# Patient Record
(demographics unavailable — no encounter records)

---

## 2025-02-14 NOTE — DISCUSSION/SUMMARY
[Normal Growth] : growth [Normal Development] : development  [No Elimination Concerns] : elimination [Continue Regimen] : feeding [No Skin Concerns] : skin [Normal Sleep Pattern] : sleep [FreeTextEntry1] : - Parent/guardian declined immunization for  flu and was counseled regarding risk.

## 2025-02-14 NOTE — PHYSICAL EXAM
[TextEntry] : General:  alert, no acute distress Head:  normocephalic Eyes:  EOMI bilateral Ears:  clear tympanic membranes with bony landmarks and light reflex present bilaterally Nose:  no rhinorrhea Mouth:  nonerythematous oropharynx Neck:  supple, full passive range of motion, no palpable masses Lungs:  clear to auscultation bilaterally Cardiac:  regular rate and rhythm, normal S1, S2 audible, no murmurs Abdomen:  soft, non tender, non distended, normoactive bowel sounds, no hepatomegaly, no splenomegaly Genitalia: Aly 1, circumcised, bilateral descended testes, no testicular masses, no hernia Lymphatics:  no abnormal lymph nodes palpated. Musculoskeletal:  normal muscle tone, no gait asymmetry, no pain or deformities with palpation of bone, muscles, joints Spine:  straight, no scoliosis Neurologic:  +2 patella DTR, cranial nerves grossly intact Skin:  no rash or lesions

## 2025-02-14 NOTE — DEVELOPMENTAL MILESTONES
[Normal Development] : Normal Development [None] : none [FreeTextEntry1] : Gross Motor: 5y-10 Fine Motor Adaptive:5y-7  Psychosocial: 5y Language:5y-3

## 2025-02-14 NOTE — PLAN
[TextEntry] : Continue balanced diet with all food groups. Brush teeth twice a day with toothbrush. Recommend visit to dentist. Maintain consistent daily routines and sleep schedule.School discussed. Ensure home is safe. Teach child about personal safety. Limit screen time to no more than 2 hours per day. Encourage physical activity. Return 1 year for routine well child check. Reviewed 5-2-1-0 questionnaire Cardiac questionnaire reviewed Patient may participate in all activities without restriction.  Epistaxis: Humidifier vaseline in anterior nares if no improvement, RTO

## 2025-02-14 NOTE — HISTORY OF PRESENT ILLNESS
[Mother] : mother [Fruit] : fruit [Vegetables] : vegetables [Dairy] : dairy [Normal] : Normal [Brushing teeth] : Brushing teeth [Yes] : Patient goes to dentist yearly [Playtime (60 min/d)] : Playtime 60 min a day [In ] : In  [No difficulties with Homework] : No difficulties with homework  [No] : No cigarette smoke exposure [Influenza] : Influenza [NO] : No [Exposure to electronic nicotine delivery system] : No exposure to electronic nicotine delivery system [FreeTextEntry7] : 4yo [facility] [de-identified] : nosebleeds few times a week, picks nose, lasts briefly [FreeTextEntry1] : nosebleeds

## 2025-06-30 NOTE — HISTORY OF PRESENT ILLNESS
[Public] : Public [Gen Ed: _____] : General Education class [unfilled] [FreeTextEntry1] : Nick, an almost 6-year-old male, presents with his mother for evaluation of attention issues and to discuss 504 plan accommodations for school. The patient recently completed , where his teacher expressed concerns about his attention and focus. The family has been working with Bre, the behavioral health specialist, who determined that Nick met diagnostic criteria for adhd. The parents are seeking a 504 plan or IEP to ensure Nick's success in school. They have been trying various coping mechanisms and are hesitant to start medication at this young age. The teacher implemented strategies such as a partitioned desk and a rocking chair, which showed some success. Despite these interventions, Nick is reportedly missing about 80% of his school day due to attention issues, although he is meeting most academic marks. He has no issues sleeping and he eats well. He has no issues socially.  - Past Medical History : No significant past medical history - Family History : - Father diagnosed with ADHD - Father prescribed Ritalin in middle school - Father continues to work on focus and coping mechanisms as an adult - Social History : - Lives with parents - Attended  this past year - Participates in [employer] - Completed swimming lessons - Tried lacrosse earlier in the year - Family engages in activities such as going to the beach and park  - Diagnostic Results : Christiano scales completed, results consistent with ADHD diagnosis, though patient is technically below the authorized age range for the assessment tool he is less than 1 month from 6 years old and the tool can be used in  age as appropirate  [Major Illness] : no major illness [Major Injury] : no major injury [Surgery] : no surgery [Hospitalizations] : no hospitalizations [New Medications] : no new medication [New Allergies] : no new allergies

## 2025-06-30 NOTE — PLAN
[CPSE Evaluation] : - Referred to the Committee on  Special Education for complete evaluation including intelligence, adaptive, speech and language, and motor evaluations [Implement 504 Plan] : - A 504 plan should be implemented. The plan should provide, at a minimum, for extended time for testing and the opportunity to take or finish tests in a quiet, separate location. [Home Behavior Techniques] : - Specific behavioral techniques that can be implemented at home were discussed [Limit Screen Time] : - Limit screen time [Medication Deferred] : - After discussion with the family the decision was made to defer consideration of treatment with medication [ADHD - A Complete & Authoritative Guide] : - ADHD - A Complete and Authoritative Guide by Vinayak Leiva [Clinical Basis] : Clinical basis for current diagnosis and clinical impressions [Prior Rating Scales] : Clinical implications of prior rating scales [504] : Entitlements under Section 504 of the Americans with Disabilities Act ("accommodation plans") [Family Questions] : Family's questions were addressed [Sleep] : The importance of sleep and strategies to ensure adequate sleep [FreeTextEntry1] : - Attention Deficit Hyperactivity Disorder (ADHD) : Nick presents with symptoms consistent with ADHD, including attention difficulties in school and at home. Family history is significant for ADHD in his father. Recent evaluation by a specialist (Bre) supports this diagnosis. Given his young age and the family's preference, we will pursue non-pharmacological interventions at this time. - Provide letter supporting 504 plan accommodations for school - Encourage continuation of behavioral strategies at home and school - Recommend exploring martial arts or similar activities for mind-body connection and focus improvement - Discuss potential for heavy work activities to aid in self-regulation - Continue monitoring sleep patterns to ensure they remain adequate - Follow up with school regarding implementation of 504 plan - Consider referral for additional educational testing if needed - Schedule follow-up appointment to assess progress and discuss further interventions if necessary - Resources provided for parent support groups and ADHD management strategies - Con't good sleep and eating patterns

## 2025-06-30 NOTE — PHYSICAL EXAM
[Normal] : patient has a normal gait [Attention Intact] : attention intact [Easily Distracted] : easily distracted [Needs frequent redirecting] : needs frequent redirecting [Able to redirect] : able to redirect [Fidgets] : fidgets [Moves quickly from one activity to another] : does not move quickly from one activity to another [Well-behaved during visit] : well-behaved during visit [Oppositional] : not oppositional [Cooperative when examined] : cooperative when examined [Smiles responsively] : smiles responsively [Hypersensitive] : not hypersensitive [Answered questions appropriately] : answered questions appropriately [Responds to name] : responds to name